# Patient Record
Sex: FEMALE | Race: WHITE | URBAN - METROPOLITAN AREA
[De-identification: names, ages, dates, MRNs, and addresses within clinical notes are randomized per-mention and may not be internally consistent; named-entity substitution may affect disease eponyms.]

---

## 2021-04-15 ENCOUNTER — OFFICE VISIT (OUTPATIENT)
Dept: ORTHOPEDIC SURGERY | Age: 76
End: 2021-04-15

## 2021-04-15 VITALS — WEIGHT: 133 LBS | BODY MASS INDEX: 25.11 KG/M2 | HEIGHT: 61 IN

## 2021-04-15 DIAGNOSIS — M25.512 CHRONIC LEFT SHOULDER PAIN: Primary | ICD-10-CM

## 2021-04-15 DIAGNOSIS — G89.29 CHRONIC LEFT SHOULDER PAIN: Primary | ICD-10-CM

## 2021-04-15 DIAGNOSIS — M54.12 LEFT CERVICAL RADICULOPATHY: ICD-10-CM

## 2021-04-15 DIAGNOSIS — M75.122 NONTRAUMATIC COMPLETE TEAR OF LEFT ROTATOR CUFF: ICD-10-CM

## 2021-04-15 PROBLEM — C50.919 BREAST CANCER (HCC): Status: ACTIVE | Noted: 2021-04-15

## 2021-04-15 PROBLEM — N13.2 URETERAL STONE WITH HYDRONEPHROSIS: Status: ACTIVE | Noted: 2017-10-09

## 2021-04-15 PROBLEM — N20.0 NEPHROLITHIASIS: Status: ACTIVE | Noted: 2021-04-15

## 2021-04-15 PROBLEM — F41.9 ANXIETY: Status: ACTIVE | Noted: 2021-04-15

## 2021-04-15 PROCEDURE — 99203 OFFICE O/P NEW LOW 30 MIN: CPT | Performed by: ORTHOPAEDIC SURGERY

## 2021-04-15 PROCEDURE — L3908 WHO COCK-UP NONMOLDE PRE OTS: HCPCS | Performed by: ORTHOPAEDIC SURGERY

## 2021-04-15 PROCEDURE — 20610 DRAIN/INJ JOINT/BURSA W/O US: CPT | Performed by: ORTHOPAEDIC SURGERY

## 2021-04-15 RX ORDER — ASCORBIC ACID 500 MG
500 TABLET ORAL DAILY
COMMUNITY

## 2021-04-15 RX ORDER — ATORVASTATIN CALCIUM 40 MG/1
TABLET, FILM COATED ORAL
COMMUNITY
Start: 2021-02-18

## 2021-04-15 RX ORDER — PANTOPRAZOLE SODIUM 40 MG/1
TABLET, DELAYED RELEASE ORAL
COMMUNITY
Start: 2021-04-14 | End: 2021-04-15

## 2021-04-15 RX ORDER — METHYLPREDNISOLONE ACETATE 40 MG/ML
80 INJECTION, SUSPENSION INTRA-ARTICULAR; INTRALESIONAL; INTRAMUSCULAR; SOFT TISSUE ONCE
Status: COMPLETED | OUTPATIENT
Start: 2021-04-15 | End: 2021-04-15

## 2021-04-15 RX ORDER — ALPRAZOLAM 0.5 MG/1
TABLET ORAL
COMMUNITY
Start: 2021-03-30

## 2021-04-15 RX ORDER — LOSARTAN POTASSIUM AND HYDROCHLOROTHIAZIDE 12.5; 1 MG/1; MG/1
TABLET ORAL
COMMUNITY
Start: 2021-01-12

## 2021-04-15 RX ORDER — LIDOCAINE HYDROCHLORIDE 10 MG/ML
8 INJECTION, SOLUTION INFILTRATION; PERINEURAL ONCE
Status: COMPLETED | OUTPATIENT
Start: 2021-04-15 | End: 2021-04-15

## 2021-04-15 RX ORDER — ASPIRIN 81 MG/1
TABLET ORAL
COMMUNITY

## 2021-04-15 RX ADMIN — LIDOCAINE HYDROCHLORIDE 8 ML: 10 INJECTION, SOLUTION INFILTRATION; PERINEURAL at 14:36

## 2021-04-15 RX ADMIN — METHYLPREDNISOLONE ACETATE 80 MG: 40 INJECTION, SUSPENSION INTRA-ARTICULAR; INTRALESIONAL; INTRAMUSCULAR; SOFT TISSUE at 14:37

## 2021-04-15 NOTE — PROGRESS NOTES
[facility]  Office Visit  New Patient  Date:  4/15/2021    Name:  Michelle Santacruz  Address:  [address] [place] [id number]    :  1945      Age:   76 y.o.    SSN:  [id number]      Medical Record Number:  <[id number]>    Chief Complaint:    Left shoulder pain    HPI:   Michelle Santacruz is a 76 y.o. female who presents for evaluation of her left shoulder. Patient states she has had 3 to 4 months of left shoulder pain. He denies any injury but reports that the pain has worsened over the last 6 or 7 weeks. He reports that his shoulder pain is worse at night and better with rest.  Has taken Advil at night which does help with her pain. He localizes the pain to the superior lateral aspect of the arm. Pain can radiate down into the hand. And she does experience whole hand numbness at times. She is not had any formal physical therapy or injections at this point. She does have left-sided neck pain. She also is a history of breast cancer 20 years ago for which she underwent radiation into the left upper thoracic area. She denies any new numbness, tingling, fevers, chills, chest pain, shortness of breath, or any other new significant symptoms. Pain Assessment  Location of Pain: Shoulder  Location Modifiers: Left  Severity of Pain: 5  Quality of Pain: Sharp, Aching, Dull  Duration of Pain: Persistent  Frequency of Pain: Constant  Aggravating Factors: Other (Comment)  Limiting Behavior: Yes  Result of Injury: No  Work-Related Injury: No  Are there other pain locations you wish to document?: No    Past History:  Past Medical History:   Diagnosis Date    Cancer ([address]     breast    HTN (hypertension)     Osteoarthritis        Past Surgical History:   Procedure Laterality Date    BREAST LUMPECTOMY      x4       Social History     Tobacco Use    Smoking status: Never Smoker    Smokeless tobacco: Never Used   Substance Use Topics    Alcohol use:  Yes Frequency: Monthly or less     Binge frequency: Never    Drug use: Never        Family History:  family history is not on file. Current Outpatient Medications:     ALPRAZolam (XANAX) 0.5 MG tablet, , Disp: , Rfl:     atorvastatin (LIPITOR) 40 MG tablet, , Disp: , Rfl:     losartan-hydroCHLOROthiazide (HYZAAR) 100-12.5 MG per tablet, , Disp: , Rfl:     ascorbic acid (VITAMIN C) 500 MG tablet, Take 500 mg by mouth daily, Disp: , Rfl:     aspirin 81 MG EC tablet, take one tablet by oral route one time each day, Disp: , Rfl:       Allergies   Allergen Reactions    Codeine Hives    Hydrocodone-Acetaminophen Hives and Nausea And Vomiting    Morphine Nausea And Vomiting    Oxycodone-Acetaminophen Hives and Nausea And Vomiting         Review of Systems: A 14 point review of systems available in the scanned medical record as documented by the patient on 4/15/21. Today's review pertinent items are noted in HPI. Musculoskeletal ROS: positive for - pain in left shoulder        Physical Exam:  Ht 5' 1\" (1.549 m)   Wt 133 lb (60.3 kg)   BMI 25.13 kg/m²     General: No acute distress, well nourished  CV: No obvious peripheral edema.  Normal peripheral pulses  Neuro: Alert & oriented x 3  Psych: Normal mood and affect    Left shoulder exam    Grossly no periscapular atrophy or asymmetry noted  Forward flexion150 left, 160 right  Abduction130 left, 130 right  External rotation 50 left, 60 right  Internal rotation to T10 bilaterally  Patient has 4/5 strength with Jobes, 4+ with champagne toast, 5/5 with external rotation  Patient does have 4 - Jobes on the right side  Patient does have crepitus subacromially with passive range of motion  Tenderness palpation over the anterior rotator cuff, no tenderness noted over the [facility]    Cervical spine exam next  Patient has limited range of motion especially with right rotation, this does elicit some pain  Positive Spurling's maneuver on the left side Radiographic:  X-rays obtained and reviewed in office, reviewed and interpreted by me today:  Views: 1 view left shoulder, 2 views cervical spine  Location: Left shoulder, cervical spine  Impression: Regarding left shoulder axillary view was obtained demonstrating well-maintained medial joint space in good alignment, cervical spine did show mild degenerative scoliosis on the AP, patient does have lower cervical degenerative changes noted as well    Assessment:  Marhsall Samayoa is a 76 y.o. female with:  1. Left chronic rotator cuff tear  2. Left cervical radiculopathy    Impression:  Encounter Diagnosis   Name Primary?  Chronic left shoulder pain Yes       Office Procedures:  Orders Placed This Encounter   Procedures    XR CERVICAL SPINE (2-3 VIEWS)     Standing Status:   Future     Number of Occurrences:   1     Standing Expiration Date:   4/15/2022       Plan:   The patient does have 2 issues today regarding her left shoulder. She does have seemingly left cervical radiculopathy versus left carpal tunnel syndrome. She also has a symptomatic left chronic rotator cuff tear as indicated by exam and MRI. We did provide the patient with a cock-up wrist splint to wear at night to help alleviate any hand numbness. Regarding her rotator cuff tear we would like her to start physical therapy as well as start meloxicam for intermittent pain relief. We also recommend a steroid injection, patient was agreeable to this. See procedure note. We also like her to have an EMG of the left upper extremity to help narrow down her source of her numbness. Would like to see her back after her EMG test.    After discussing the risks and benefits of a corticosteroid injection, Sarah did state an understanding and gave verbal consent to proceed.   After cleansing the injection site with Chlora-prep and using aseptic techniques,  2 CC of Depo Medrol 40mg/ml and 8 CC of 1% lidocaine were injected in the left shoulder subacromial space and glenohumeral joint. She  tolerated the procedure well with no immediate adverse sequelae after the injection. A bandage was placed over the injection site. Appropriate post injections instructions were given to the patient. Vianca Blevins Fellow    The encounter with Herve Cross was supervised by Dr Gm Napier who personally examined the patient and reviewed the plan. This dictation was performed with a verbal recognition program (DRAGON) and it was checked for errors. It is possible that there are still dictated errors within this office note. If so, please bring any errors to my attention for an addendum. All efforts were made to ensure that this office note is accurate.   ______________  I was physically present and personally supervised the Orthopaedic Sports Medicine Fellow in the evaluation and development of a treatment plan for this patient. I personally interviewed the patient and performed a physical examination. In addition, I discussed the patient's condition and treatment options with them. I have also reviewed and agree with the past medical, family and social history unless otherwise noted. All of the patient's questions were answered. Samia Napier MD, PhD  4/15/2021

## 2021-04-15 NOTE — LETTER
[] Sports specific program:   [x] Cryotherapy      [] Electrical stimulation     [] Paraffin  [] Whirlpool  [] TENS    [x] Home exercise program (copy to patient). Perform exercises for:   15     minutes    2-3      times/day  [x] Supervised physical therapy  Frequency: []  1x week  [x] 2x week  [] 3x week  [] Other:   Duration: [] 2 weeks   [] 4 weeks  [x] 6 weeks  [] Other:     Additional Instructions:           Samia Mac MD, PhD

## 2021-07-13 ENCOUNTER — OFFICE VISIT (OUTPATIENT)
Dept: ORTHOPEDIC SURGERY | Age: 76
End: 2021-07-13
Payer: COMMERCIAL

## 2021-07-13 VITALS — BODY MASS INDEX: 25.11 KG/M2 | WEIGHT: 133 LBS | HEIGHT: 61 IN

## 2021-07-13 DIAGNOSIS — M75.122 NONTRAUMATIC COMPLETE TEAR OF LEFT ROTATOR CUFF: ICD-10-CM

## 2021-07-13 DIAGNOSIS — M25.512 CHRONIC LEFT SHOULDER PAIN: Primary | ICD-10-CM

## 2021-07-13 DIAGNOSIS — G89.29 CHRONIC LEFT SHOULDER PAIN: Primary | ICD-10-CM

## 2021-07-13 DIAGNOSIS — M54.12 LEFT CERVICAL RADICULOPATHY: ICD-10-CM

## 2021-07-13 DIAGNOSIS — I89.0 LYMPHEDEMA OF LEFT ARM: ICD-10-CM

## 2021-07-13 PROCEDURE — 99213 OFFICE O/P EST LOW 20 MIN: CPT | Performed by: ORTHOPAEDIC SURGERY

## 2021-07-13 PROCEDURE — L3760 EO ADJ JT PREFAB CUSTOM FIT: HCPCS | Performed by: ORTHOPAEDIC SURGERY

## 2021-07-13 NOTE — PROGRESS NOTES
Chief Complaint  Chief Complaint   Patient presents with    Shoulder Pain     F/U LEFT SHOULDER       History of Present Illness:  Naga Esqueda is a 68 y.o. female here for follow-up of the left arm. Patient was last seen in the office on 4/15/2021 at which point she was given a cortisone injection for rotator cuff tendinitis. 3 weeks after the injection she did notice that she had new onset of swelling and erythema within the left elbow and forearm. She was sent for DVT scan as well as CT scan of the chest to rule out DVT or other causes of her swelling which both came back as negative. She was placed on antibiotics to cover cellulitis and later developed diarrhea. Since that time she had no change in her swelling or erythema and it continues to bother her mostly at night. She does admit today that she had 15 lymph nodes removed several years ago for history of breast cancer with positive lymph nodes in that axilla. She has not tried compression. She denies any numbness or tingling    Medical History:  Patient's medications, allergies, past medical, surgical, social and family histories were reviewed and updated as appropriate. Pertinent items are noted in HPI  Review of systems reviewed from Patient History Form dated on 7/13/2021 and available in the patient's chart under the Media tab. Vital Signs: There were no vitals filed for this visit. Neuro: Alert & oriented x 3,  normal,  no focal deficits noted. Normal affect. Eyes: sclera clear  Ears: Normal external ear  Mouth:  No perioral lesions  Pulm: Respirations unlabored and regular  Pulse: Regular rate and rhythm   Skin: Warm, well perfused      Constitutional: The physical examination finds the patient to be well-developed and well-nourished. The patient is alert and oriented x3 and was cooperative throughout the visit.       Left arm exam  Left arm continues to have pitting edema from mid humerus down to the hand  There is some erythema noted over the medial aspect of the left elbow. No tenderness to palpation  No hypersensitivity to touch  No warmth is appreciated      Radiology:     No imaging obtained today    Pertinent imaging was reviewed         Assessment : Baylee Sifuentes is a 68 y.o. female with lymphedema of the left arm. She may also have a partial thickness rotator cuff tear and some biceps tendinopathy accounting for some localized discomfort. Impression:  Encounter Diagnoses   Name Primary?  Chronic left shoulder pain Yes    Nontraumatic complete tear of left rotator cuff     Left cervical radiculopathy        Office Procedures:  Orders Placed This Encounter   Procedures    Breg Hex Elbow Brace     Patient was prescribed a Breg Hex Elbow Brace. The left elbow will require stabilization / immobilization from this semi-rigid / rigid orthosis to improve their function. The orthosis will assist in protecting the affected area, provide functional support and facilitate healing. The patient was educated and fit by a healthcare professional with expert knowledge and specialization in brace application while under the direct supervision of the treating physician. Verbal and written instructions for the use of and application of this item were provided. They were instructed to contact the office immediately should the brace result in increased pain, decreased sensation, increased swelling or worsening of the condition. Plan: We had a good discussion with Donell Juarez today regarding her left arm swelling. At this point we would recommend that she follow-up with her breast surgeon Dr. Marti Montes. We would also like to provide her today with a compression sleeve which will likely help her with the swelling. In the interim we would like her to start with physical therapy to continue to work on her shoulder range of motion so that she does not stiffen up and develop an adhesive capsulitis.   We will provide her with a physical therapy referral form today. We will have her return to the office for follow-up once she has seen Dr. Stephany Foster. Laureano Murillo is in agreement with this plan. All questions were answered to patient's satisfaction and was encouraged to call with any further questions. Melissa Ferrara DO  Clinical Fellow  [facility]     The encounter with Laureano Murillo was supervised by Dr Juan Boateng who personally examined the patient and reviewed the plan. This dictation was performed with a verbal recognition program (DRAGON) and it was checked for errors. It is possible that there are still dictated errors within this office note. If so, please bring any errors to my attention for an addendum. All efforts were made to ensure that this office note is accurate. 07/13/21  2:04 PM  ______________________  I was physically present and personally supervised the Orthopaedic Sports Medicine Fellow in the evaluation and development of a treatment plan for this patient. I personally interviewed the patient and performed a physical examination. In addition, I discussed the patient's condition and treatment options with them. I have also reviewed and agree with the past medical, family and social history unless otherwise noted. All of the patient's questions were answered. Samia Boateng MD, PhD  7/13/2021

## 2021-07-13 NOTE — LETTER
Shoulder Elbow Rehabilitation Referral    Patient Name: Presley Smith      YOB: 1945    Diagnosis: Left chronic rotator cuff tear and left arm lymphedema  Precautions: None  Date of Prescription: 7/13/2021    [x] Evaluate and Treat    Post Op Instructions:  [] Continuous passive motion (CPM)  [] Elbow range of motion  [] Exercise in plane of scapula   []  Strengthening     [] Pulley and instruction    [] Home exercise program (copy to patient)   [] Sling when arm at risk  [] Sling or brace at all times   [] AAROM: Forward elevation to              [] AAROM: External rotation  To      [] Isometric external rotator strengthening [] AAROM: internal rotation: up the back  [] Isometric abductor strengthening  [] AAROM: Internal abduction     [] Isometric internal rotator strengthening [] AAROM: cross-body adduction             Stretching:     Strengthening:  [x] Four quadrant (FE, ER, IR, CBA)  [x] Sleeper stretch    [] Rotator cuff (ER, IR, Abd)  [] Forward Elevation    [] External Rotators     [] External Rotation    [] Internal Rotators  [x] Internal Rotation: up/back   [] Abductors     [] Internal Rotation: supine in abduction  [] Flexors  [] Cross-body abduction    [] Extensors  [] Pendulum (FE, Abd/Add, cw/ccw)  [] Scapular Stabilizers   [] Wall-walking (FE, Abd)    [] Shoulder shrugs     [] Table slides      [] Rhomboid pinch  [x] Elbow (flex, ext, pron, sup)    [] Lat.  Pull downs     [] Medial epicondylitis program    [] Forward punch   [] Lateral epicondylitis program    [] Internal rotators     [] Progressive resistive exercises  [] Bench Press        [] Bench press plus  Activities:     [] Lateral pull-downs  [] Rowing     [] Progressive two-hand supine press  [] Stepper/Exercise bike   [] Biceps: curls/supination  [] Swimming  [] Water exercises    Modalities:     Return to Sport:  [] Ultrasound     [] Plyometrics  [] Iontophoresis     [] Rhythmic stabilization  [] Moist heat     [] Core strengthening   [] Massage     [] Sports specific program:      [] Cryotherapy      [] Electrical stimulation     [] Paraffin  [] Whirlpool  [] TENS    [] Home exercise program (copy to patient).    Perform exercises for:        minutes          times/day  [] Supervised physical therapy  Frequency: []  1x week  [x] 2x week  [] 3x week  [] Other:   Duration: [] 2 weeks   [] 4 weeks  [x] 6 weeks  [] Other:     Additional Instructions:

## 2021-07-14 ENCOUNTER — TELEPHONE (OUTPATIENT)
Dept: ORTHOPEDIC SURGERY | Age: 76
End: 2021-07-14

## 2021-07-14 NOTE — TELEPHONE ENCOUNTER
7[place]14/21 [facility] [place]     -  NO PRECERT REQUIRED - PER SUMMER @ [facility]  REF # [id number]

## 2021-10-15 ENCOUNTER — CLINICAL DOCUMENTATION (OUTPATIENT)
Dept: OTHER | Age: 76
End: 2021-10-15